# Patient Record
Sex: FEMALE | Race: WHITE
[De-identification: names, ages, dates, MRNs, and addresses within clinical notes are randomized per-mention and may not be internally consistent; named-entity substitution may affect disease eponyms.]

---

## 2017-12-18 ENCOUNTER — HOSPITAL ENCOUNTER (OUTPATIENT)
Dept: HOSPITAL 31 - C.ENDO | Age: 52
Discharge: HOME | End: 2017-12-18
Payer: COMMERCIAL

## 2017-12-18 VITALS — HEART RATE: 85 BPM | SYSTOLIC BLOOD PRESSURE: 130 MMHG | DIASTOLIC BLOOD PRESSURE: 87 MMHG

## 2017-12-18 VITALS — RESPIRATION RATE: 15 BRPM | OXYGEN SATURATION: 99 %

## 2017-12-18 VITALS — TEMPERATURE: 97.1 F

## 2017-12-18 VITALS — BODY MASS INDEX: 25.3 KG/M2

## 2017-12-18 DIAGNOSIS — K64.8: Primary | ICD-10-CM

## 2017-12-18 PROCEDURE — 45378 DIAGNOSTIC COLONOSCOPY: CPT

## 2018-09-24 ENCOUNTER — HOSPITAL ENCOUNTER (EMERGENCY)
Dept: HOSPITAL 31 - C.ER | Age: 53
Discharge: HOME | End: 2018-09-24
Payer: COMMERCIAL

## 2018-09-24 VITALS
HEART RATE: 70 BPM | RESPIRATION RATE: 18 BRPM | TEMPERATURE: 98.6 F | SYSTOLIC BLOOD PRESSURE: 125 MMHG | DIASTOLIC BLOOD PRESSURE: 76 MMHG

## 2018-09-24 VITALS — BODY MASS INDEX: 25.3 KG/M2

## 2018-09-24 VITALS — OXYGEN SATURATION: 100 %

## 2018-09-24 DIAGNOSIS — R51: Primary | ICD-10-CM

## 2018-09-24 DIAGNOSIS — E78.00: ICD-10-CM

## 2018-09-24 DIAGNOSIS — Z87.891: ICD-10-CM

## 2018-09-24 DIAGNOSIS — I10: ICD-10-CM

## 2018-09-24 LAB
ALBUMIN SERPL-MCNC: 4.6 G/DL (ref 3.5–5)
ALBUMIN/GLOB SERPL: 1.2 {RATIO} (ref 1–2.1)
ALT SERPL-CCNC: 44 U/L (ref 9–52)
AST SERPL-CCNC: 30 U/L (ref 14–36)
BASOPHILS # BLD AUTO: 0.1 K/UL (ref 0–0.2)
BASOPHILS NFR BLD: 0.8 % (ref 0–2)
BUN SERPL-MCNC: 11 MG/DL (ref 7–17)
CALCIUM SERPL-MCNC: 9.7 MG/DL (ref 8.6–10.4)
EOSINOPHIL # BLD AUTO: 0.4 K/UL (ref 0–0.7)
EOSINOPHIL NFR BLD: 4.5 % (ref 0–4)
ERYTHROCYTE [DISTWIDTH] IN BLOOD BY AUTOMATED COUNT: 13.2 % (ref 11.5–14.5)
GFR NON-AFRICAN AMERICAN: > 60
HGB BLD-MCNC: 14.3 G/DL (ref 11–16)
LYMPHOCYTES # BLD AUTO: 2.5 K/UL (ref 1–4.3)
LYMPHOCYTES NFR BLD AUTO: 29.9 % (ref 20–40)
MCH RBC QN AUTO: 32 PG (ref 27–31)
MCHC RBC AUTO-ENTMCNC: 34.3 G/DL (ref 33–37)
MCV RBC AUTO: 93.3 FL (ref 81–99)
MONOCYTES # BLD: 0.5 K/UL (ref 0–0.8)
MONOCYTES NFR BLD: 6.4 % (ref 0–10)
NEUTROPHILS # BLD: 4.9 K/UL (ref 1.8–7)
NEUTROPHILS NFR BLD AUTO: 58.4 % (ref 50–75)
NRBC BLD AUTO-RTO: 0 % (ref 0–2)
PLATELET # BLD: 253 K/UL (ref 130–400)
PMV BLD AUTO: 8.5 FL (ref 7.2–11.7)
RBC # BLD AUTO: 4.48 MIL/UL (ref 3.8–5.2)
WBC # BLD AUTO: 8.4 K/UL (ref 4.8–10.8)

## 2018-09-24 NOTE — C.PDOC
History Of Present Illness


53 y/o female with PMHx of HTN and hypercholesterolemia, presents with 

complaints of a headache for 2 days. Patient reports the pain began in the back 

yesterday, and is now radiating to front. Took her blood pressure, found it to 

be elevated at ~170s/90s at home. Also noticed some pressure in her chest. 

Patient denies any dizziness, palpitations, visual changes, SOB, nausea, 

vomiting, SOB, fever, chills, or other associated symptoms. States she did not 

take any pain med today. Patient tried Excedrin yesterday with minimal relief. 

She reports compliance with her HCTZ 25 mg daily.





Time Seen by Provider: 18 13:43


Chief Complaint (Nursing): Headache


History Per: Patient


History/Exam Limitations: no limitations


Onset/Duration Of Symptoms: Days


Current Symptoms Are (Timing): Still Present





Past Medical History


Reviewed: Historical Data, Nursing Documentation, Vital Signs


Vital Signs: 


 Last Vital Signs











Temp  98.7 F   18 13:03


 


Pulse  79   18 13:03


 


Resp  20   18 13:03


 


BP  146/87   18 13:03


 


Pulse Ox  100   18 16:17














- Medical History


PMH: Asthma, Colonic Polyps, HTN, Hypercholesterolemia


   Denies: Chronic Kidney Disease





[facility] [place] [facility]








CLOSED ENDOSCOPIC BIOPSY OF LARGE INTESTINE (13)


DESTRUC BARTHOLIN GLAND (14)


ENDO RECTUM POLYPECTOMY (13)








Family History: States: Unknown Family Hx





- Social History


Hx Tobacco Use: No


Hx Alcohol Use: No


Hx Substance Use: No





- Immunization History


Hx Tetanus Toxoid Vaccination: Yes


Hx Influenza Vaccination: No


Hx Pneumococcal Vaccination: No





Review Of Systems


Except As Marked, All Systems Reviewed And Found Negative.


Constitutional: Negative for: Fever, Chills


Eyes: Negative for: Vision Change


ENT: Negative for: Nose Congestion


Cardiovascular: Positive for: Chest Pain.  Negative for: Palpitations


Respiratory: Negative for: Shortness of Breath


Gastrointestinal: Negative for: Nausea, Vomiting, Abdominal Pain


Neurological: Positive for: Headache.  Negative for: Weakness, Numbness, 

Incoordination, Change in Speech, Confusion, Dizziness





Physical Exam





- Physical Exam


Appears: Non-toxic, No Acute Distress


Skin: Warm, Dry


Head: Atraumatic, Normacephalic


Eye(s): bilateral: PERRL, EOMI, Other (bilateral conjunctival injection)


Oral Mucosa: Moist


Neck: Normal ROM, Supple


Chest: Symmetrical, No Deformity, No Tenderness


Cardiovascular: Rhythm Regular, No Murmur


Respiratory: Normal Breath Sounds, No Rales, No Rhonchi, No Wheezing


Gastrointestinal/Abdominal: Bowel Sounds (normal), Soft, No Tenderness, No 

Distention


Extremity: Bilateral: Atraumatic, Normal Color And Temperature, Normal ROM


Pulses: Left Dorsalis Pedis: Normal, Right Dorsalis Pedis: Normal


Neurological/Psych: Oriented x3, Normal Speech, Normal Cranial Nerves, Normal 

Motor, Normal Sensation, Other (No focal deficits)





ED Course And Treatment





- Laboratory Results


Result Diagrams: 


 18 14:11





 18 14:11


ECG: Interpreted By Me, Viewed By Me


ECG Rhythm: Sinus Rhythm


ECG Interpretation: No Acute Changes


Interpretation Of ECG: No ST elevations or depressions


Rate From EC


O2 Sat by Pulse Oximetry: 100 (RA)


Pulse Ox Interpretation: Normal





Medical Decision Making


Medical Decision Making: 





Impression: 53 y/o female with headache and chest pain, PMHx of HTN and 

hypercholesterolemia





Plan:


--Blood work w/ cardiac enzymes


--Urinalysis


--EKG


--Chest X-Ray


--Motrin 600 mg PO


--Tylenol 975 mg PO





Labs reviewed: trop negative.








Disposition


Counseled Patient/Family Regarding: Studies Performed, Diagnosis, Need For 

Followup





- Disposition


Disposition: HOME/ ROUTINE


Disposition Time: 16:16


Condition: STABLE


Instructions:  Headache, Adult


Forms:  Gen Discharge Inst [place], [facility] ([place])





- POA


Present On Arrival: None





- Clinical Impression


Clinical Impression: 


 Headache, Hypertension








- Scribe Statement


The provider has reviewed the documentation as recorded by the Scribe (Mira Herrmann)


Provider Attestation: 





All medical record entries made by the Scribe were at my direction and 

personally dictated by me. I have reviewed the chart and agree that the record 

accurately reflects my personal performance of the history, physical exam, 

medical decision making, and the department course for this patient. I have 

also personally directed, reviewed, and agree with the discharge instructions 

and disposition.

## 2018-09-25 NOTE — CARD
--------------- APPROVED REPORT --------------





Date of service: 09/24/2018



EKG Measurement

Heart [id number]

[id number]

[id number]

[id number]

[id number]



<Conclusion>

Normal sinus rhythm

Normal ECG